# Patient Record
Sex: MALE | Race: WHITE | NOT HISPANIC OR LATINO | Employment: FULL TIME | URBAN - METROPOLITAN AREA
[De-identification: names, ages, dates, MRNs, and addresses within clinical notes are randomized per-mention and may not be internally consistent; named-entity substitution may affect disease eponyms.]

---

## 2023-02-07 ENCOUNTER — APPOINTMENT (OUTPATIENT)
Dept: GENERAL RADIOLOGY | Facility: CLINIC | Age: 43
End: 2023-02-07
Attending: EMERGENCY MEDICINE
Payer: OTHER MISCELLANEOUS

## 2023-02-07 ENCOUNTER — HOSPITAL ENCOUNTER (EMERGENCY)
Facility: CLINIC | Age: 43
Discharge: HOME | End: 2023-02-07
Attending: EMERGENCY MEDICINE | Admitting: EMERGENCY MEDICINE
Payer: OTHER MISCELLANEOUS

## 2023-02-07 VITALS
OXYGEN SATURATION: 99 % | SYSTOLIC BLOOD PRESSURE: 120 MMHG | WEIGHT: 270 LBS | RESPIRATION RATE: 18 BRPM | HEIGHT: 74 IN | TEMPERATURE: 97 F | DIASTOLIC BLOOD PRESSURE: 85 MMHG | BODY MASS INDEX: 34.65 KG/M2 | HEART RATE: 98 BPM

## 2023-02-07 DIAGNOSIS — S61.111A: ICD-10-CM

## 2023-02-07 DIAGNOSIS — S69.91XA: ICD-10-CM

## 2023-02-07 PROCEDURE — 73140 X-RAY EXAM OF FINGER(S): CPT | Mod: RT

## 2023-02-07 PROCEDURE — 250N000013 HC RX MED GY IP 250 OP 250 PS 637: Performed by: EMERGENCY MEDICINE

## 2023-02-07 PROCEDURE — 12001 RPR S/N/AX/GEN/TRNK 2.5CM/<: CPT

## 2023-02-07 PROCEDURE — 99283 EMERGENCY DEPT VISIT LOW MDM: CPT

## 2023-02-07 RX ORDER — ACETAMINOPHEN 500 MG
1000 TABLET ORAL ONCE
Status: COMPLETED | OUTPATIENT
Start: 2023-02-07 | End: 2023-02-07

## 2023-02-07 RX ORDER — CEPHALEXIN 500 MG/1
500 CAPSULE ORAL 4 TIMES DAILY
Qty: 28 CAPSULE | Refills: 0 | Status: SHIPPED | OUTPATIENT
Start: 2023-02-07 | End: 2023-02-14

## 2023-02-07 RX ADMIN — ACETAMINOPHEN 1000 MG: 500 TABLET ORAL at 11:47

## 2023-02-07 ASSESSMENT — ACTIVITIES OF DAILY LIVING (ADL)
ADLS_ACUITY_SCORE: 35
ADLS_ACUITY_SCORE: 33

## 2023-02-07 ASSESSMENT — ENCOUNTER SYMPTOMS: WOUND: 1

## 2023-02-07 NOTE — DISCHARGE INSTRUCTIONS
Please take Antibiotic for 7 days and follow up with primary care provider for recheck of injury.  Sutures to be removed in 7-14 days. Return to ED if you develop severe pain, redness, drainage, fevers, or sudden lack of range of motion.    Discharge Instructions  Laceration (Cut)    You were seen today for a laceration (cut).  Your provider examined your laceration for any problems such a buried foreign body (like glass, a splinter, or gravel), or injury to blood vessels, tendons, and nerves.  Your provider may have also rinsed and/or scrubbed your laceration to help prevent an infection. It may not be possible to find all problems with your laceration on the first visit; occasionally foreign bodies or a tendon injury can go undetected.    Your laceration may have been closed in one of several ways:  No closure: many wounds will heal just fine without closure.  Stitches: regular stitches that require removal.  Staples: skin staples are often used in the scalp/head.  Wound adhesive (glue): skin glue can be used for certain lacerations and doesn t require removal.  Wound strips (aka Butterfly bandages or steri-strips): these are bandages that help to close a wound.  Absorbable stitches:  dissolving  stitches that go away on their own and usually don t require removal.    A small percentage of wounds will develop an infection regardless of how well the wound is cared for. Antibiotics are generally not indicated to prevent an infection so are only given for a small number of high-risk wounds. Some lacerations are too high risk to close, and are left open to heal because closure can increase the likelihood that an infection will develop.    Remember that all lacerations, no matter how expertly repaired, will cause scarring. We consider many factors, techniques, and materials, in our efforts to provide the best possible cosmetic outcome.    Generally, every Emergency Department visit should have a follow-up clinic visit  with either a primary or a specialty clinic/provider. Please follow-up as instructed by your emergency provider today.     Return to the Emergency Department right away if:  You have more redness, swelling, pain, drainage (pus), a bad smell, or red streaking from your laceration as these symptoms could indicate an infection.  You have a fever of 100.4 F or more.  You have bleeding that you cannot stop at home. If your cut starts to bleed, hold pressure on the bleeding area with a clean cloth or put pressure over the bandage.  If the bleeding does not stop after using constant pressure for 30 minutes, you should return to the Emergency Department for further treatment.  An area past the laceration is cool, pale, or blue compared with the other side, or has a slower return of color when squeezed.  Your dressing seems too tight or starts to get uncomfortable or painful. For children, signs of a problem might be irritability or restlessness.  You have loss of normal function or use of an area, such as being unable to straighten or bend a finger normally.  You have a numb area past the laceration.    Return to the Emergency Department or see your regular provider if:  The laceration starts to come open.   You have something coming out of the cut or a feeling that there is something in the laceration.  Your wound will not heal, or keeps breaking open. There can always be glass, wood, dirt or other things in any wound.  They will not always show up, even on x-rays.  If a wound does not heal, this may be why, and it is important to follow-up with your regular provider.    Home Care:  Take your dressing off in 12-24 hours, or as instructed by your provider, to check your laceration. Remove the dressing sooner if it seems too tight or painful, or if it is getting numb, tingly, or pale past the dressing.  Gently wash your laceration 1-2 times daily with clean water and mild soap. It is okay to shower or run clean water over  the laceration, but do not let the laceration soak in water (no swimming).  If your laceration was closed with wound adhesive or strips: pat it dry and leave it open to the air. For all other repairs: after you wash your laceration, or at least 2 times a day, apply antibiotic ointment (such as Neosporin  or Bacitracin ) to the laceration, then cover it with a Band-Aid  or gauze.  Keep the laceration clean. Wear gloves or other protective clothing if you are around dirt.    Follow-up for removal:  If your wound was closed with staples or regular stitches, they need to be removed according to the instructions and timeline specified by your provider today.  If your wound was closed with absorbable ( dissolving ) sutures, they should fall out, dissolve, or not be visible in about one week. If they are still visible, then they should be removed according to the instructions and timeline specified by your provider today.    Scars:  To help minimize scarring:  Wear sunscreen over the healed laceration when out in the sun.  Massage the area regularly once healed.  You may apply Vitamin E to the healed wound.  Wait. Scars improve in appearance over months and years.    If you were given a prescription for medicine here today, be sure to read all of the information (including the package insert) that comes with your prescription.  This will include important information about the medicine, its side effects, and any warnings that you need to know about.  The pharmacist who fills the prescription can provide more information and answer questions you may have about the medicine.  If you have questions or concerns that the pharmacist cannot address, please call or return to the Emergency Department.       Remember that you can always come back to the Emergency Department if you are not able to see your regular provider in the amount of time listed above, if you get any new symptoms, or if there is anything that worries you.

## 2023-02-07 NOTE — ED PROVIDER NOTES
"  History     Chief Complaint:  Hand Injury       HPI     This is an [facility] supervisory note, I saw the patient with Tyra Pang PA-C    Azael Washington is a 42 year old male who presents with an injury to his right thumb, tetanus was last boosted in 2013, he refuses tetanus booster today.      Independent Historian:   None - Patient Only    Review of External Notes: [facility] was reviewed    Allergies:  No Known Allergies     Medications:    No current outpatient medications on file.      Past Medical History:    Previously healthy    Social History:   Works for [employer]  PCP: No primary care provider on file.     Physical Exam   Patient Vitals for the past 24 hrs:   BP Temp Temp src Pulse Resp SpO2 Height Weight   02/07/23 1144 120/85 97  F (36.1  C) Temporal 98 18 99 % 1.88 m (6' 2\") 122.5 kg (270 lb)        Physical Exam  General alert interactive in moderate distress  Cardiovascular: Regular rate and rhythm, thumb capillary refill is intact  Respiratory, no respiratory distress  Musculoskeletal: Full range of motion of the right thumb  Skin: 1.5 cm laceration through the distal tip of the right thumb through the nail, no obvious foreign body    Emergency Department Course     Imaging:  XR Finger Right G/E 2 Views   Final Result   IMPRESSION: Soft tissue injury to the distal end of the thumb. There   is a tiny minimally displaced age-indeterminate fracture along the   dorsal radial aspect of the interphalangeal joint. There is no   evidence of a radiopaque foreign body or osteomyelitis.      GILBERTO MARCOS MD            SYSTEM ID:  [id number]         Report per radiology  Procedures   Laceration repair per LUPILLO Pang    Emergency Department Course & Assessments:             Interventions:  Medications   acetaminophen (TYLENOL) tablet 1,000 mg (1,000 mg Oral Given 2/7/23 1147)        Independent Interpretation (X-rays, CTs, rhythm strip):  I reviewed the patient's thumb radiograph    Social Determinants of " Health affecting care:   None  Disposition:  The patient was discharged to home.     Impression & Plan    Medical Decision Making:  Following presentation history and physical examination were performed, radiographs were undertaken, I doubt an acute fracture and think the fracture noted on radiograph is likely old.  There is no signs of neurovascular compromise or more concerning illness.  Laceration was repaired and the patient was discharged on on prophylactic antibiotics.  He declined a tetanus booster in the emergency department.    Diagnosis:    ICD-10-CM    1. Soft tissue injury of hand, right, initial encounter  S69.91XA                2/7/2023   Trigger, Santi Terrazas,*      Trigger, Santi Terrazas MD  02/07/23 1547

## 2023-02-07 NOTE — ED TRIAGE NOTES
Pt presents to Ed after work place injury. Pt reports getting right thumb caught between belts on a fan.

## 2023-02-07 NOTE — ED PROVIDER NOTES
Emergency Department Attending Supervision Note  2/7/2023  1:56 PM      I evaluated this patient in conjunction with Tyra Pang PA-C      Briefly, the patient presented with  ***      On my exam,   General: Alert, interactive in mild distress  Head:  Scalp is atraumatic  Eyes:  The pupils are equal, round, and reactive to light    EOM's intact    No scleral icterus  ENT:      Nose:  The external nose is normal  Ears:  External ears are normal  Mouth/Throat: The oropharynx is normal    Mucus membranes are moist       Neck:  Normal range of motion.      There is no rigidity.    Trachea is in the midline         CV:  Regular rate and rhythm    No murmur   Resp:  Breath sounds are clear bilaterally    Non-labored, no retractions or accessory muscle use      GI:  Abdomen is soft, no distension, no tenderness.       MS:  Normal strength in all 4 extremities, No midline cervical, thoracic, or lumbar tenderness  Skin:  Warm and dry, No rash or lesions noted.  Neuro:      Strength 5/5 x4.  Sensation intact  In all 4 extremities.       Cranial nerves 2-12 intact.    GCS: 15  Psych:  Awake. Alert.  Normal affect.      Appropriate interactions.      Results:    XR Finger Right G/E 2 Views   Final Result   IMPRESSION: Soft tissue injury to the distal end of the thumb. There   is a tiny minimally displaced age-indeterminate fracture along the   dorsal radial aspect of the interphalangeal joint. There is no   evidence of a radiopaque foreign body or osteomyelitis.      GILBERTO MARCOS MD            SYSTEM ID:  [id number]      Read by radiology    ED course:      My impression is ***        Diagnosis  No diagnosis found.      Santi Rodriguez,*